# Patient Record
Sex: MALE | Race: BLACK OR AFRICAN AMERICAN
[De-identification: names, ages, dates, MRNs, and addresses within clinical notes are randomized per-mention and may not be internally consistent; named-entity substitution may affect disease eponyms.]

---

## 2020-06-01 ENCOUNTER — HOSPITAL ENCOUNTER (EMERGENCY)
Dept: HOSPITAL 97 - ER | Age: 35
LOS: 1 days | Discharge: HOME | End: 2020-06-02
Payer: COMMERCIAL

## 2020-06-01 DIAGNOSIS — J45.901: Primary | ICD-10-CM

## 2020-06-01 PROCEDURE — 99284 EMERGENCY DEPT VISIT MOD MDM: CPT

## 2020-06-02 VITALS — OXYGEN SATURATION: 98 % | SYSTOLIC BLOOD PRESSURE: 145 MMHG | DIASTOLIC BLOOD PRESSURE: 91 MMHG

## 2020-06-02 VITALS — TEMPERATURE: 98 F

## 2020-06-02 NOTE — ER
Nurse's Notes                                                                                     

 [facility]                                                                 

Name: Mykel Oliva                                                                               

Age: 35 yrs                                                                                       

Sex: Male                                                                                         

: 1985                                                                                   

MRN: [id number]                                                                                   

Arrival Date: 2020                                                                          

Time: 23:14                                                                                       

Account#: [id number]                                                                            

Bed 16                                                                                            

Private MD:                                                                                       

Diagnosis: Unspecified asthma with (acute) exacerbation                                           

                                                                                                  

Presentation:                                                                                     

                                                                                             

23:34 Chief complaint: Patient states: SOB since yesterday. Wheezing got worse today. Out of  ll1 

      nebulizer. Coronavirus screen: Proceed with normal triage. Patient denies a cough.          

      Patient reports shortness of breath or difficulty breathing. Patient denies measured        

      and/or subjective temperature greater than 100.4F prior to today's visit. Patient           

      denies travel on a cruise ship or to a country the [facility] currently lists as an affected       

      area. Patient denies contact with known and/or suspected case of COVID-19. Ebola            

      Screen: Patient denies travel to an Ebola-affected area in the 21 days before illness       

      onset. Initial Sepsis Screen: Does the patient meet any 2 criteria? RR > 20 per min.        

      Risk Assessment: Do you want to hurt yourself or someone else? Patient reports no           

      desire to harm self or others. Onset of symptoms was May 31, 2020.                          

23:34 Method Of Arrival: Ambulatory                                                           ll1 

23:34 Acuity: JEEVAN 3                                                                           ll1 

23:40 Initial Sepsis Screen: Does the patient have a suspected source of infection? No.       vc  

      Patient's initial sepsis screen is negative.                                                

                                                                                                  

Triage Assessment:                                                                                

23:40 General: Appears in no apparent distress. uncomfortable, Behavior is appropriate for    vc  

      age, anxious.                                                                               

                                                                                                  

Historical:                                                                                       

- Allergies:                                                                                      

23:35 No Known Allergies;                                                                     ll1 

- PMHx:                                                                                           

23:35 Asthma;                                                                                 ll1 

                                                                                                  

- Immunization history:: Adult Immunizations up to date.                                          

- Social history:: Smoking status: Patient denies any tobacco usage or history of.                

  Patient/guardian denies using alcohol, street drugs, tobacco products.                          

                                                                                                  

                                                                                                  

Screenin/02                                                                                             

01:02 Abuse screen: Denies threats or abuse. Denies injuries from another. Nutritional        lp1 

      screening: No deficits noted. Tuberculosis screening: No symptoms or risk factors           

      identified. Fall Risk None identified.                                                      

                                                                                                  

Assessment:                                                                                       

                                                                                             

23:40 General: Appears in no apparent distress. uncomfortable, obese, Behavior is             vc  

      cooperative, anxious. Pain: Denies pain.                                                    

23:40 Neuro: Level of Consciousness is awake, alert, obeys commands, Oriented to person,      vc  

      place, time, situation, Appropriate for age. Cardiovascular: Capillary refill < 3           

      seconds Patient's skin is warm and dry. Respiratory: Airway is patent Respiratory           

      effort is even, unlabored, Respiratory pattern is regular, symmetrical. Respiratory:        

      Reports shortness of breath labored breathing Breath sounds with wheezes bilaterally.       

      the patient has mild shortness of breath. GI: No signs and/or symptoms were reported        

      involving the gastrointestinal system. GI: Reports. : No signs and/or symptoms were       

      reported regarding the genitourinary system.                                                

                                                                                             

00:30 Reassessment: Patient appears in no apparent distress at this time. Patient and/or      vc  

      family updated on plan of care and expected duration. Pain level reassessed. Patient        

      states symptoms have improved.                                                              

01:00 Reassessment: Patient is alert, oriented x 3, equal unlabored respirations, skin        lp1 

      warm/dry/pink. Patient states feeling better. Patient states symptoms have improved.        

                                                                                                  

Vital Signs:                                                                                      

                                                                                             

23:34  / 98; Pulse 85; Resp 21; Temp 98.0; Pulse Ox 100% ; Pain 8/10;                   ll1 

                                                                                             

01:00  / 91; Pulse 105; Resp 20; Pulse Ox 98% on R/A;                                   lp1 

                                                                                                  

ED Course:                                                                                        

                                                                                             

23:14 Patient arrived in ED.                                                                  cl3 

23:35 Triage completed.                                                                       ll1 

23:36 Estuardo Farooq PA is PHCP.                                                                cp  

23:36 Rikki Lo MD is Attending Physician.                                                    cp  

23:36 Arm band placed on Patient placed in an exam room, on a stretcher.                      ll1 

23:40 Patient has correct armband on for positive identification. Bed in low position. Side   vc  

      rails up X2. Pulse ox on. NIBP on.                                                          

                                                                                             

01:00 No provider procedures requiring assistance completed. Patient did not have IV access   lp1 

      during this emergency room visit.                                                           

01:41 Clari Mckeon RN is Primary Nurse.                                                  vc  

                                                                                                  

Administered Medications:                                                                         

00:20 Drug: Albuterol 2.5 mg Route: Inhalation;                                               mg2 

00:20 Drug: AtroVENT Aerosol 0.5 mg Route: Inhalation;                                        mg2 

00:20 Drug: predniSONE 60 mg Route: PO;                                                       mg2 

                                                                                                  

                                                                                                  

Outcome:                                                                                          

00:41 Discharge ordered by MD.                                                                cp  

01:00 Discharged to home ambulatory.                                                          lp1 

01:00 Condition: good                                                                             

01:00 Discharge instructions given to patient, Instructed on discharge instructions, follow       

      up and referral plans. medication usage, Demonstrated understanding of instructions,        

      follow-up care, medications, Prescriptions given X 3.                                       

01:15 Patient left the ED.                                                                    vc  

                                                                                                  

Signatures:                                                                                       

Rand Juarez RN                         RN   lp1                                                  

Estuardo Farooq PA PA cp Gardose, Michele, RN                    RN   mg2                                                  

Tigre Bardales                                cl3                                                  

Clari Mckeon RN RN vc Lewis, Lynsay, RN RN   ll1                                                  

                                                                                                  

Corrections: (The following items were deleted from the chart)                                    

02:29 01:41 Patient left the ED. vc                                                           vc  

                                                                                                  

**************************************************************************************************

## 2020-06-02 NOTE — EDPHYS
Physician Documentation                                                                           

 [facility]                                                                 

Name: Mykel Oliva                                                                               

Age: 35 yrs                                                                                       

Sex: Male                                                                                         

: 1985                                                                                   

MRN: [id number]                                                                                   

Arrival Date: 2020                                                                          

Time: 23:14                                                                                       

Account#: [id number]                                                                            

Bed 16                                                                                            

Private MD:                                                                                       

ED Physician Rikki Lo                                                                             

HPI:                                                                                              

                                                                                             

00:05 This 35 yrs old Black Male presents to ER via Ambulatory with complaints of Asthma      cp  

      Exacerbation.                                                                               

00:05 The patient presents to the emergency department with wheezing, Current therapy:        cp  

      albuterol inhaler, albuterol nebs, that began ran out of inhalers and NEB solution          

      about 1 week ago, the patient was reported to have chest tightness, shortness of            

      breath, wheezing. Onset: The symptoms/episode began/occurred gradually. Associated          

      signs and symptoms: Pertinent negatives: chest pain, fever, cough, sore throat.             

                                                                                                  

Historical:                                                                                       

- Allergies:                                                                                      

                                                                                             

23:35 No Known Allergies;                                                                     ll1 

- PMHx:                                                                                           

23:35 Asthma;                                                                                 ll1 

                                                                                                  

- Immunization history:: Adult Immunizations up to date.                                          

- Social history:: Smoking status: Patient denies any tobacco usage or history of.                

  Patient/guardian denies using alcohol, street drugs, tobacco products.                          

                                                                                                  

                                                                                                  

ROS:                                                                                              

                                                                                             

00:10 Constitutional: Negative for body aches, chills, fever, poor PO intake.                 cp  

00:10 Eyes: Negative for injury, pain, redness, and discharge.                                cp  

00:10 ENT: Negative for ear pain, sore throat, difficulty swallowing, difficulty handling         

      secretions.                                                                                 

00:10 Cardiovascular: Negative for chest pain, edema, palpitations.                               

00:10 Respiratory: Positive for shortness of breath, wheezing, Negative for cough.                

00:10 Abdomen/GI: Negative for abdominal pain.                                                    

00:10 Back: Negative for pain at rest, pain with movement.                                        

00:10 Skin: Negative for rash.                                                                    

00:10 Neuro: Negative for altered mental status, dizziness, headache, syncope, weakness.          

00:10 All other systems are negative.                                                             

                                                                                                  

Exam:                                                                                             

00:10 Constitutional: The patient appears in no acute distress, alert, awake,                 cp  

      non-diaphoretic, non-toxic, well developed, well nourished, morbid obesity                  

00:10 Head/Face:  Normocephalic, atraumatic.                                                  cp  

00:10 Eyes: Periorbital structures: appear normal, Conjunctiva: normal, no exudate, no            

      injection, Lids and lashes: appear normal, bilaterally.                                     

00:10 ENT: External ear(s): are unremarkable, Nose: is normal, Posterior pharynx: Airway: no      

      evidence of obstruction, patent.                                                            

00:10 Chest/axilla: Inspection: normal, Palpation: is normal, no crepitus, no tenderness.         

00:10 Cardiovascular: Rate: normal, Rhythm: regular, Edema: is not appreciated, JVD: is not       

      appreciated.                                                                                

00:10 Respiratory: the patient does not display signs of respiratory distress,  Respirations:     

      labored breathing, is not present, intercostal retractions, are absent, shallow             

      respirations, are not present, Breath sounds: bronchial sounds, are not appreciated,        

      decreased breath sounds, are not appreciated, stridor, is not appreciated, wheezing: is     

      not appreciated.                                                                            

00:10 Abdomen/GI: Exam negative for discomfort, distension, guarding, Inspection: obese           

00:10 Back: pain, is absent, ROM is normal.                                                       

                                                                                                  

Vital Signs:                                                                                      

                                                                                             

23:34  / 98; Pulse 85; Resp 21; Temp 98.0; Pulse Ox 100% ; Pain 8/10;                   ll1 

                                                                                             

01:00  / 91; Pulse 105; Resp 20; Pulse Ox 98% on R/A;                                   lp1 

                                                                                                  

MDM:                                                                                              

                                                                                             

23:36 Patient medically screened.                                                             cp  

                                                                                             

00:39 Differential diagnosis: asthma exacerbation, COVID-19, URI, pneumonia. Antibiotic       cp  

      administration: Not indicated, the patient's primary pathology is reactive airway           

      disease. Data reviewed: vital signs, nurses notes. Counseling: I had a detailed             

      discussion with the patient and/or guardian regarding: the historical points, exam          

      findings, and any diagnostic results supporting the discharge/admit diagnosis, the          

      presence of at least one elevated blood pressure reading (>120/80) during this              

      emergency department visit, the need for outpatient follow up, a family practitioner,       

      to return to the emergency department if symptoms worsen or persist or if there are any     

      questions or concerns that arise at home. Response to treatment: improved, and as a         

      result, I will discharge patient.                                                           

                                                                                                  

Administered Medications:                                                                         

00:20 Drug: Albuterol 2.5 mg Route: Inhalation;                                               mg2 

00:20 Drug: AtroVENT Aerosol 0.5 mg Route: Inhalation;                                        mg2 

00:20 Drug: predniSONE 60 mg Route: PO;                                                       mg2 

                                                                                                  

                                                                                                  

Disposition:                                                                                      

05:30 Co-signature as Attending Physician, Rikki Lo MD.                                        pkl 

                                                                                                  

Disposition:                                                                                      

20 00:41 Discharged to Home. Impression: Unspecified asthma with (acute) exacerbation.      

- Condition is Stable.                                                                            

- Discharge Instructions: Asthma, Adult, How to Take Your Blood Pressure, Easy-to-Read.           

- Prescriptions for Albuterol Sulfate 2.5 mg /3 mL (0.083 %) Inhalation Solution for              

  Nebulization - inhale 1 unit by NEBULIZATION route every 8 hours As needed; 1 box.              

  Prednisone 20 mg Oral Tablet - take 2 tablet by ORAL route once daily for 5 days; 10            

  tablet. Albuterol Sulfate 90 mcg/actuation - inhale 1-2 puff by INHALATION route                

  every 4-6 hours; 1 Inhaler.                                                                     

- Work release form, Medication Reconciliation Form, Thank You Letter, Antibiotic                 

  Education, Prescription Opioid Use form.                                                        

- Follow up: Private Physician; When: 2 - 3 days; Reason: Recheck today's complaints.             

- Problem is an acute exacerbation.                                                               

- Symptoms have improved.                                                                         

                                                                                                  

                                                                                                  

                                                                                                  

Signatures:                                                                                       

Rikki Lo MD MD   pkl                                                  

Estuardo Farooq PA PA cp Gardose, Michele, RN                    RN   mg2                                                  

Clari Mckeon RN RN   vc                                                   

Bassem Bardales RN                       RN   ll1                                                  

                                                                                                  

Corrections: (The following items were deleted from the chart)                                    

01:41 00:41 2020 00:41 Discharged to Home. Impression: Unspecified asthma with (acute)  vc  

      exacerbation. Condition is Stable. Forms are Medication Reconciliation Form, Thank You      

      Letter, Antibiotic Education, Prescription Opioid Use. Follow up: Private Physician;        

      When: 2 - 3 days; Reason: Recheck today's complaints. Problem is an acute exacerbation.     

      Symptoms have improved. cp                                                                  

                                                                                                  

**************************************************************************************************